# Patient Record
Sex: MALE | HISPANIC OR LATINO
[De-identification: names, ages, dates, MRNs, and addresses within clinical notes are randomized per-mention and may not be internally consistent; named-entity substitution may affect disease eponyms.]

---

## 2022-03-04 ENCOUNTER — TRANSCRIBE ORDERS (OUTPATIENT)
Dept: OTHER | Age: 52
End: 2022-03-04

## 2022-03-04 DIAGNOSIS — H61.21 EXCESSIVE CERUMEN IN RIGHT EAR CANAL: Primary | ICD-10-CM

## 2022-03-04 DIAGNOSIS — H93.8X3 FULLNESS IN EAR, BILATERAL: ICD-10-CM

## 2022-03-07 NOTE — TELEPHONE ENCOUNTER
FUTURE VISIT INFORMATION      FUTURE VISIT INFORMATION:    Date: 4/21/22    Time: 10:10AM    Location: [facility]  REFERRAL INFORMATION:    Referring provider:  Harinder Staley MD    Referring providers clinic:  [facility]      Reason for visit/diagnosis  Excessive cerumen in right ear canal & Fullness in ear, bilateral, Care Everywhere referral from Dr. Harinder Staley at [facility], recs in epic - sched per pt    RECORDS REQUESTED FROM:       Clinic name Comments Records Status Imaging Status   Internal [facility]   3/2/22 note from Harinder Staley MD Care everywhere

## 2022-04-08 DIAGNOSIS — Z01.10 ENCOUNTER FOR HEARING TEST: Primary | ICD-10-CM

## 2022-04-21 ENCOUNTER — OFFICE VISIT (OUTPATIENT)
Dept: AUDIOLOGY | Facility: CLINIC | Age: 52
End: 2022-04-21
Attending: INTERNAL MEDICINE
Payer: COMMERCIAL

## 2022-04-21 ENCOUNTER — OFFICE VISIT (OUTPATIENT)
Dept: OTOLARYNGOLOGY | Facility: CLINIC | Age: 52
End: 2022-04-21
Attending: INTERNAL MEDICINE
Payer: COMMERCIAL

## 2022-04-21 ENCOUNTER — PRE VISIT (OUTPATIENT)
Dept: OTOLARYNGOLOGY | Facility: CLINIC | Age: 52
End: 2022-04-21
Payer: COMMERCIAL

## 2022-04-21 VITALS
TEMPERATURE: 97.2 F | DIASTOLIC BLOOD PRESSURE: 69 MMHG | HEART RATE: 65 BPM | SYSTOLIC BLOOD PRESSURE: 108 MMHG | OXYGEN SATURATION: 96 % | HEIGHT: 68 IN | WEIGHT: 178 LBS | BODY MASS INDEX: 26.98 KG/M2

## 2022-04-21 DIAGNOSIS — H90.3 SENSORINEURAL HEARING LOSS (SNHL), BILATERAL: Primary | ICD-10-CM

## 2022-04-21 DIAGNOSIS — H93.8X3 EAR FULLNESS, BILATERAL: Primary | ICD-10-CM

## 2022-04-21 DIAGNOSIS — Z01.10 ENCOUNTER FOR HEARING TEST: ICD-10-CM

## 2022-04-21 PROCEDURE — 92557 COMPREHENSIVE HEARING TEST: CPT | Performed by: AUDIOLOGIST

## 2022-04-21 PROCEDURE — 99203 OFFICE O/P NEW LOW 30 MIN: CPT | Mod: 25 | Performed by: REGISTERED NURSE

## 2022-04-21 PROCEDURE — 92504 EAR MICROSCOPY EXAMINATION: CPT | Performed by: REGISTERED NURSE

## 2022-04-21 PROCEDURE — 92550 TYMPANOMETRY & REFLEX THRESH: CPT | Performed by: AUDIOLOGIST

## 2022-04-21 ASSESSMENT — PAIN SCALES - GENERAL: PAINLEVEL: NO PAIN (0)

## 2022-04-21 NOTE — PATIENT INSTRUCTIONS
- You were seen in the ENT Clinic today by Christa Nayak NP.   - Follow up as needed if symptoms return and fail to improve with TMJ interventions including NSAIDs, warm compresses, and soft diet.  - Please send a [employer] message or call the ENT clinic at [phone] with any questions or concerns.

## 2022-04-21 NOTE — PROGRESS NOTES
"  Otolaryngology Clinic  April 21, 2022    Chief Complaint:   Bilateral ear fullness       History of Present Illness:   Kiet Alegre is a 52 year old male who presents today for evaluation of bilateral aural fullness.  Patient states that he experienced an acute onset of bilateral ear pressure and \"clogging\" that occurred approximately 6 weeks ago.  States symptoms were worse when eating.  Also felt a brain fog and that his thoughts were not clear.  Denies any pain or drainage from the ears at that time.  Bothersome symptoms lasted for about 1-1/2 weeks and have slowly improved.  Today, patient reports bilateral ears feel much better but there is still a little pressure on the right side if he thinks about it.  No symptoms when eating. Patient denies any neuro changes or change in hearing.  Denies any dizziness.  Patient does not have a history of frequent ear infections or ear surgeries.    Past Medical History:  No past medical history on file.    Past Surgical History:  No past surgical history on file.    Medications:  No current outpatient medications on file.     Allergies:  No Known Allergies     ROS: 10 point ROS neg other than the symptoms noted above in the HPI.    Physical Exam:    /69 (BP Location: Right arm, Patient Position: Sitting, Cuff Size: Adult Regular)   Pulse 65   Temp 97.2  F (36.2  C) (Temporal)   Ht 1.727 m (5' 8\")   Wt 80.7 kg (178 lb)   SpO2 96%   BMI 27.06 kg/m       Constitutional:  The patient was accompanied by wife, well-groomed, and in no acute distress.     Neurologic: Alert and oriented x 3.  CN's III-XII within normal limits.  Voice normal.   Psychiatric: The patient's affect was calm, cooperative, and appropriate.     Communication:  Normal; communicates verbally, normal voice quality.   Respiratory: Breathing comfortably without stridor or exertion of accessory muscles.   Ears: Pinnae and tragus non-tender.  EAC's and TM's were clear.        Otologic microscope " exam:    Right ear was examined under the microscope.  Normal appearing TM, nicely aerated middle ear space.   Left ear was also examined under the microscope.  Normal appearing TM, nicely aerated middle ear space.        Audiogram: 4/21/2022- data independently reviewed  Bilateral normal sloping to borderline mild rising to normal sensorineural hearing loss  % @ 60 dB  Acoustic Reflexes: Present in left ipsi. Absent in other conditions  Tympanograms: type A bilaterally          Assessment and Plan:  1. Ear fullness, bilateral  There is no evidence of effusion or retraction of bilateral on today's exam. Reviewed audiogram and tympanogram with patient, hearing and tympanogram are normal. Because ear exam is normal and symptoms seemed to be exacerbated by jaw movement when chewing, I suspect these symptoms are due to inflammation of the temporomandibular joint and neck/jaw tension.     Discussed management of TMJ including NSAIDS, warm compresses, awareness of clenching and relaxation techniques, and soft diet. It is reassuring that symptoms are improving. Recommend that patient continue to monitor symptoms and use TMJ strategies if symptoms reappear.    Patient can follow up as needed if symptoms worsen or fail to improve with management strategies discussed above.    Christa Nayak DNP, APRN, CNP  Otolaryngology  Head & Neck Surgery  [phone]    30 minutes spent on the date of the encounter doing chart review, history and exam, documentation and further activities per the note

## 2022-04-21 NOTE — PROGRESS NOTES
AUDIOLOGY REPORT    SUMMARY: Audiology visit completed. See audiogram for results.      RECOMMENDATIONS: Follow-up with ENT.      Stephanie Holbrook.  Licensed Audiologist  [place] #[id number]
